# Patient Record
(demographics unavailable — no encounter records)

---
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
## 2025-06-25 NOTE — DISCUSSION/SUMMARY
[FreeTextEntry1] : 33M here for evaluation of mediastinal adenopathy  #Mediastinal adenopathy  - CT chest report from 4/2025 reviewed. It mentions normal lung parenchyma and mildly enlarged mediastinal LNs measuring roughly 1cm. - He will bring in CD for me to review. We discussed possible DDx of enlarged mediastinal LNs at length. They could be reactive in setting of recent infection vs sarcoidosis related vs malignant. Lowest suspicion for malignancy - Given their small size, it is likely they represent a reactive process and may warrant repeat CT at 3 month jadyn to evaluate progression. If persistent, then will plan for Bx for further work-up. I explained EBUS procedure and Pt is agreeable

## 2025-07-23 NOTE — DISCUSSION/SUMMARY
[FreeTextEntry1] : 33M here for follow up  #Mediastinal adenopathy #sarcoidosis  - Repeat CT chest reviewed and he underwent EBUS 7/2025 with pathology of LN's confirming sarcoidosis - Discussed that his hoarseness could be a symptom of sarcoidosis and would warrant treatment. Rx given for course of prednisone - So far, it only appears he has LN involvement - We discussed routine testing he will need given new diagnosis, including PFTs (Ordered) and annual Ophthalmology exams - Will plan for follow up in office soon to evaluate for response to treatment
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
